# Patient Record
Sex: FEMALE | URBAN - METROPOLITAN AREA
[De-identification: names, ages, dates, MRNs, and addresses within clinical notes are randomized per-mention and may not be internally consistent; named-entity substitution may affect disease eponyms.]

---

## 2023-02-21 ENCOUNTER — APPOINTMENT (RX ONLY)
Dept: URBAN - METROPOLITAN AREA CLINIC 23 | Facility: CLINIC | Age: 38
Setting detail: DERMATOLOGY
End: 2023-02-21

## 2023-02-21 DIAGNOSIS — L81.1 CHLOASMA: ICD-10-CM

## 2023-02-21 DIAGNOSIS — L71.0 PERIORAL DERMATITIS: ICD-10-CM

## 2023-02-21 PROCEDURE — ? PRESCRIPTION

## 2023-02-21 PROCEDURE — 99203 OFFICE O/P NEW LOW 30 MIN: CPT

## 2023-02-21 PROCEDURE — ? COUNSELING

## 2023-02-21 PROCEDURE — ? RECOMMENDATIONS

## 2023-02-21 PROCEDURE — ? IN-HOUSE DISPENSING PHARMACY

## 2023-02-21 RX ORDER — DOXYCYCLINE HYCLATE 100 MG/1
CAPSULE, GELATIN COATED ORAL QD
Qty: 30 | Refills: 1 | Status: ERX | COMMUNITY
Start: 2023-02-21

## 2023-02-21 RX ADMIN — DOXYCYCLINE HYCLATE: 100 CAPSULE, GELATIN COATED ORAL at 00:00

## 2023-02-21 ASSESSMENT — LOCATION SIMPLE DESCRIPTION DERM: LOCATION SIMPLE: LEFT CHEEK

## 2023-02-21 ASSESSMENT — LOCATION ZONE DERM: LOCATION ZONE: FACE

## 2023-02-21 ASSESSMENT — LOCATION DETAILED DESCRIPTION DERM: LOCATION DETAILED: LEFT SUPERIOR MEDIAL BUCCAL CHEEK

## 2023-02-21 NOTE — PROCEDURE: RECOMMENDATIONS
Detail Level: Zone
Recommendations (Free Text): Zilxi foam sample given to patient, patient will use this nightly.  \\nRecommended Vanicream face wash and moisturizer \\nRecommended Elta MD SPF daily
Render Risk Assessment In Note?: no
Recommendations (Free Text): Recommended patient 4-6 treatments of [facility] for melasma, pricing information given: full face 1 treatment $700.00 or 3 treatments package $1800.00. \\nAdd the hydroquinone brightening pads to night time routine for 8 weeks

## 2023-02-21 NOTE — HPI: RASH
Is This A New Presentation, Or A Follow-Up?: Rash
Additional History: Patient states that she began doing facials when she first noticed. Patient has also applied hydrocortisone with no change.

## 2023-02-21 NOTE — PROCEDURE: MIPS QUALITY
Detail Level: Detailed
Additional Notes: Patient is currently not on any medications.
Quality 130: Documentation Of Current Medications In The Medical Record: Documentation of a medical reason(s) for not documenting, updating, or reviewing the patientâs current medications list (e.g., patient is in an urgent or emergent medical situation)

## 2023-02-21 NOTE — PROCEDURE: IN-HOUSE DISPENSING PHARMACY
Product 31 Units Dispensed: 0
Product 40 Unit Type: mg
Product 1 Unit Type: unit(s)
Render Product Pricing In Note: Yes
Name Of Product 1: Hydroquinone 8% Brightening Pads
Product 1 Units Dispensed: 1
Detail Level: Zone